# Patient Record
Sex: FEMALE | Race: BLACK OR AFRICAN AMERICAN | Employment: FULL TIME | URBAN - METROPOLITAN AREA
[De-identification: names, ages, dates, MRNs, and addresses within clinical notes are randomized per-mention and may not be internally consistent; named-entity substitution may affect disease eponyms.]

---

## 2017-07-19 ENCOUNTER — OFFICE VISIT (OUTPATIENT)
Dept: INTERNAL MEDICINE CLINIC | Age: 37
End: 2017-07-19

## 2017-07-19 VITALS
SYSTOLIC BLOOD PRESSURE: 115 MMHG | RESPIRATION RATE: 17 BRPM | DIASTOLIC BLOOD PRESSURE: 77 MMHG | TEMPERATURE: 98.1 F | WEIGHT: 146.2 LBS | HEIGHT: 63 IN | HEART RATE: 83 BPM | BODY MASS INDEX: 25.91 KG/M2 | OXYGEN SATURATION: 97 %

## 2017-07-19 DIAGNOSIS — Z00.00 ENCOUNTER FOR ANNUAL PHYSICAL EXAM: Primary | ICD-10-CM

## 2017-07-19 RX ORDER — CYCLOBENZAPRINE HCL 5 MG
5 TABLET ORAL
Qty: 20 TAB | Refills: 1 | Status: SHIPPED | OUTPATIENT
Start: 2017-07-19 | End: 2019-09-04

## 2017-07-19 NOTE — PROGRESS NOTES
Chief Complaint   Patient presents with    Complete Physical     1. Have you been to the ER, urgent care clinic since your last visit? Hospitalized since your last visit? No    2. Have you seen or consulted any other health care providers outside of the [address] [place] [address] since your last visit? Include any pap smears or colon screening.  No

## 2017-07-19 NOTE — MR AVS SNAPSHOT
Visit Information Date & Time Provider Department Dept. Phone Encounter #  
 7/19/2017  3:00 PM Hiwot Aguirre, [address] [facility] [phone] [id number] Follow-up Instructions Return in about 1 year (around 7/19/2018) for CPE/ fasting labs. Upcoming Health Maintenance Date Due DTaP/Tdap/Td series (1 - Tdap) 9/7/2002 PAP AKA CERVICAL CYTOLOGY 9/7/2002 INFLUENZA AGE 9 TO ADULT 8/1/2017 Allergies as of 7/19/2017  Review Complete On: 7/19/2017 By: Hiwot Aguirre MD  
  
 Severity Noted Reaction Type Reactions Amoxicillin  05/23/2011    Hives Current Immunizations  Reviewed on 2/24/2012 No immunizations on file. Not reviewed this visit You Were Diagnosed With   
  
 Codes Comments Encounter for annual physical exam    -  Primary ICD-10-CM: Z00.00 ICD-9-CM: V70.0 Vitals BP Pulse Temp Resp Height(growth percentile) Weight(growth percentile) 115/77 (BP 1 Location: Left arm, BP Patient Position: Sitting) 83 98.1 °F (36.7 °C) (Oral) 17 5' 2.5\" (1.588 m) 146 lb 3.2 oz (66.3 kg) LMP SpO2 BMI OB Status Smoking Status 02/01/2017 (Approximate) 97% 26.31 kg/m2 Implant Never Smoker BMI and BSA Data Body Mass Index Body Surface Area  
 26.31 kg/m 2 1.71 m 2 Preferred Pharmacy Pharmacy Name Phone [id number] [address] [phone] Your Updated Medication List  
  
   
This list is accurate as of: 7/19/17  4:27 PM.  Always use your most recent med list.  
  
  
  
  
 cyclobenzaprine 5 mg tablet Commonly known as:  FLEXERIL Take 1 Tab by mouth nightly. NEXPLANON 68 mg Impl Generic drug:  etonogestrel  
by SubDERmal route. Prescriptions Sent to Pharmacy Refills  
 cyclobenzaprine (FLEXERIL) 5 mg tablet 1 Sig: Take 1 Tab by mouth nightly.   
 Class: Normal  
 Pharmacy: [address] [place] [address]  #: [phone] Route: Oral  
  
We Performed the Following AMB POC EKG ROUTINE W/ 12 LEADS, INTER & REP [43518 CPT(R)] AMB POC URINALYSIS DIP STICK AUTO W/ MICRO  [12678 CPT(R)] CBC WITH AUTOMATED DIFF [79877 CPT(R)] LIPID PANEL [85886 CPT(R)] METABOLIC PANEL, COMPREHENSIVE [53120 CPT(R)] REFERRAL TO CARDIOLOGY [GJP86 Custom] Comments:  
 Please evaluate patient for palpitations and chest discomfort T4, FREE [id number] CPT(R)] TSH 3RD GENERATION [11175 CPT(R)] Follow-up Instructions Return in about 1 year (around 7/19/2018) for CPE/ fasting labs. Referral Information Referral ID Referred By Referred To  
  
 [id number] [place], [address], [place]   
   [address], [facility] Phone: [phone] Fax: [phone] Visits Status Start Date End Date 1 New Request 7/19/17 7/19/18 If your referral has a status of pending review or denied, additional information will be sent to support the outcome of this decision. Introducing [facility] HE[facility] SERVICES! [address] introduces [employer] patient portal. Now you can access parts of your medical record, email your doctor's office, and request medication refills online. 1. In your internet browser, go to [email or web address] 2. Click on the First Time User? Click Here link in the Sign In box. You will see the New Member Sign Up page. 3. Enter your [employer] Access Code exactly as it appears below. You will not need to use this code after youve completed the sign-up process. If you do not sign up before the expiration date, you must request a new code. · [employer] Access Code: [email or web address] Expires: 10/17/2017  4:24 PM 
 
4. Enter the last four digits of your Social Security Number (xxxx) and Date of Birth (mm/dd/yyyy) as indicated and click Submit. You will be taken to the next sign-up page. 5. Create a [employer] ID. This will be your [employer] login ID and cannot be changed, so think of one that is secure and easy to remember. 6. Create a [employer] password. You can change your password at any time. 7. Enter your Password Reset Question and Answer. This can be used at a later time if you forget your password. 8. Enter your e-mail address. You will receive e-mail notification when new information is available in [address] 9. Click Sign Up. You can now view and download portions of your medical record. 10. Click the Download Summary menu link to download a portable copy of your medical information. If you have questions, please visit the Frequently Asked Questions section of the [employer] website. Remember, [employer] is NOT to be used for urgent needs. For medical emergencies, dial 911. Now available from your iPhone and Android! Please provide this summary of care documentation to your next provider. Your primary care clinician is listed as Eleuterio Pisano. If you have any questions after today's visit, please call [phone].

## 2017-07-19 NOTE — PROGRESS NOTES
SUBJECTIVE:   Aurelia Wynne is a 29 y.o. female who is here for complete physical exam.    Pt denies regular exercise. Pt reports occasionally feeling a \"gush\" or \"big movement\" through her heart. Pt states this feeling only occurs occasionally but is significant enough that she notices when it happens. Pt reports loose stool with eating certain foods. Pt reports MRI last year for hip pain. Pt states she went to PT with relief of sciatica. Pt specifically denies changes in vision or hearing, trouble with swallowing or taste, CP, SOB, heartburn or upset stomach, change in bowel habits, problems urinating, unusual joint or muscle pains, numbness or tingling in extremities, or skin lesions of concern. Routine Health Maintenance:   Tdap: due, ordered today    Gyn: utd 7/19/2017    At this time, she is otherwise doing well and has brought no other complaints to my attention today. For a list of the medical issues addressed today, see the assessment and plan below. PMH:   Past Medical History:   Diagnosis Date    Acid reflux     IBS (irritable bowel syndrome)        PSH:  has no past surgical history on file. Allergies: is allergic to amoxicillin. Meds:   Current Outpatient Prescriptions   Medication Sig    cyclobenzaprine (FLEXERIL) 5 mg tablet Take 1 Tab by mouth nightly.  etonogestrel (NEXPLANON) 68 mg impl by SubDERmal route. No current facility-administered medications for this visit. Fam hx: family history includes High Cholesterol in her father and mother; Hypertension in her father and mother. Soc hx:  reports that she has never smoked. She has never used smokeless tobacco. She reports that she does not drink alcohol or use illicit drugs.       Review of Systems - History obtained from the patient  General ROS: negative  Psychological ROS: negative  Ophthalmic ROS: negative  ENT ROS: negative  Respiratory ROS: no cough, shortness of breath, or wheezing  Cardiovascular ROS: +\"gushing\" sensation in chest, otherwise no chest pain or dyspnea on exertion  Gastrointestinal ROS: +loose stool related to diet, no abdominal pain, change in bowel habits, or black or bloody stools  Genito-Urinary ROS: negative  Musculoskeletal ROS: negative  Neurological ROS: negative  Dermatological ROS: negative    OBJECTIVE:   Vitals:   Visit Vitals    /77 (BP 1 Location: Left arm, BP Patient Position: Sitting)    Pulse 83    Temp 98.1 °F (36.7 °C) (Oral)    Resp 17    Ht 5' 2.5\" (1.588 m)    Wt 146 lb 3.2 oz (66.3 kg)    LMP 02/01/2017 (Approximate)    SpO2 97%    BMI 26.31 kg/m2     Gen: Pleasant 29 y.o. female in NAD. HEENT: PERRLA. EOMI. OP moist and pink. EARS: TMs normal and canals equal bilaterally. NECK: Supple. No LAD. No thyromegaly. HEART: RRR, No M/G/R.   LUNGS: CTAB No W/R. ABDOMEN: S, NT, ND, BS+. EXTREMITIES: Warm. No C/C/E.  MUSCULOSKELETAL: Normal ROM, muscle strength 5/5 all groups. Muscle spasm in right shoulder and right upper back. NEURO: Alert and oriented x 3. Cranial nerves grossly intact. No focal sensory or motor deficits noted. SKIN: Warm. Dry. No rashes or other lesions noted. ASSESSMENT/ PLAN:     Emmett Hernandes was seen today for complete physical.    Diagnoses and all orders for this visit:    Encounter for annual physical exam  -     LIPID PANEL  -     METABOLIC PANEL, COMPREHENSIVE  -     T4, FREE  -     TSH 3RD GENERATION  -     CBC WITH AUTOMATED DIFF  -     AMB POC URINALYSIS DIP STICK AUTO W/ MICRO   -     AMB POC EKG ROUTINE W/ 12 LEADS, INTER & REP  -     cyclobenzaprine (FLEXERIL) 5 mg tablet; Take 1 Tab by mouth nightly.  -     REFERRAL TO CARDIOLOGY      I prescribed Flexeril 5mg nightly for management of spasm in upper right back and right shoulder.     Pt was referred to Dr. Lawanda Zhang (cardiology) and I ordered an EKG, which returned with no significant findings, for further investigation of \"gushing\" sensation in chest.     Aurelia Caceres's physical exam was normal and urinalysis was clear. Pt was given lab orders for a CBC, CMP, lipid panel, T4, and TSH to have done when she is fasting. Pt will f/u in one year for CPE/fasting labs. Follow-up Disposition:  Return in about 1 year (around 7/19/2018) for CPE/ fasting labs. I have reviewed the patient's medications and risks/side effects/benefits were discussed. Diagnosis(-es) explained to patient and questions answered. Literature provided where appropriate.       Written by Milly Mosqueda, as dictated by Becka Johnson MD.

## 2017-07-21 ENCOUNTER — APPOINTMENT (OUTPATIENT)
Dept: INTERNAL MEDICINE CLINIC | Age: 37
End: 2017-07-21

## 2017-07-22 LAB
ALBUMIN SERPL-MCNC: 4.2 G/DL (ref 3.5–5.5)
ALBUMIN/GLOB SERPL: 1.6 {RATIO} (ref 1.2–2.2)
ALP SERPL-CCNC: 46 IU/L (ref 39–117)
ALT SERPL-CCNC: 10 IU/L (ref 0–32)
AST SERPL-CCNC: 15 IU/L (ref 0–40)
BASOPHILS # BLD AUTO: 0 X10E3/UL (ref 0–0.2)
BASOPHILS NFR BLD AUTO: 0 %
BILIRUB SERPL-MCNC: 0.5 MG/DL (ref 0–1.2)
BUN SERPL-MCNC: 15 MG/DL (ref 6–20)
BUN/CREAT SERPL: 18 (ref 9–23)
CALCIUM SERPL-MCNC: 9 MG/DL (ref 8.7–10.2)
CHLORIDE SERPL-SCNC: 102 MMOL/L (ref 96–106)
CHOLEST SERPL-MCNC: 166 MG/DL (ref 100–199)
CO2 SERPL-SCNC: 21 MMOL/L (ref 18–29)
CREAT SERPL-MCNC: 0.83 MG/DL (ref 0.57–1)
EOSINOPHIL # BLD AUTO: 0.1 X10E3/UL (ref 0–0.4)
EOSINOPHIL NFR BLD AUTO: 1 %
ERYTHROCYTE [DISTWIDTH] IN BLOOD BY AUTOMATED COUNT: 13.7 % (ref 12.3–15.4)
GLOBULIN SER CALC-MCNC: 2.6 G/DL (ref 1.5–4.5)
GLUCOSE SERPL-MCNC: 85 MG/DL (ref 65–99)
HCT VFR BLD AUTO: 39.4 % (ref 34–46.6)
HDLC SERPL-MCNC: 55 MG/DL
HGB BLD-MCNC: 13.7 G/DL (ref 11.1–15.9)
IMM GRANULOCYTES # BLD: 0 X10E3/UL (ref 0–0.1)
IMM GRANULOCYTES NFR BLD: 0 %
LDLC SERPL CALC-MCNC: 102 MG/DL (ref 0–99)
LYMPHOCYTES # BLD AUTO: 1.3 X10E3/UL (ref 0.7–3.1)
LYMPHOCYTES NFR BLD AUTO: 25 %
MCH RBC QN AUTO: 30.4 PG (ref 26.6–33)
MCHC RBC AUTO-ENTMCNC: 34.8 G/DL (ref 31.5–35.7)
MCV RBC AUTO: 88 FL (ref 79–97)
MONOCYTES # BLD AUTO: 0.4 X10E3/UL (ref 0.1–0.9)
MONOCYTES NFR BLD AUTO: 7 %
NEUTROPHILS # BLD AUTO: 3.5 X10E3/UL (ref 1.4–7)
NEUTROPHILS NFR BLD AUTO: 67 %
PLATELET # BLD AUTO: 252 X10E3/UL (ref 150–379)
POTASSIUM SERPL-SCNC: 4.3 MMOL/L (ref 3.5–5.2)
PROT SERPL-MCNC: 6.8 G/DL (ref 6–8.5)
RBC # BLD AUTO: 4.5 X10E6/UL (ref 3.77–5.28)
SODIUM SERPL-SCNC: 141 MMOL/L (ref 134–144)
T4 FREE SERPL-MCNC: 1.06 NG/DL (ref 0.82–1.77)
TRIGL SERPL-MCNC: 45 MG/DL (ref 0–149)
TSH SERPL DL<=0.005 MIU/L-ACNC: 2.02 UIU/ML (ref 0.45–4.5)
VLDLC SERPL CALC-MCNC: 9 MG/DL (ref 5–40)
WBC # BLD AUTO: 5.2 X10E3/UL (ref 3.4–10.8)

## 2018-08-29 ENCOUNTER — OFFICE VISIT (OUTPATIENT)
Dept: INTERNAL MEDICINE CLINIC | Age: 38
End: 2018-08-29

## 2018-08-29 VITALS
TEMPERATURE: 98.3 F | BODY MASS INDEX: 24.59 KG/M2 | SYSTOLIC BLOOD PRESSURE: 106 MMHG | HEIGHT: 63 IN | OXYGEN SATURATION: 97 % | RESPIRATION RATE: 17 BRPM | HEART RATE: 76 BPM | WEIGHT: 138.8 LBS | DIASTOLIC BLOOD PRESSURE: 72 MMHG

## 2018-08-29 DIAGNOSIS — Z00.00 ANNUAL PHYSICAL EXAM: Primary | ICD-10-CM

## 2018-08-29 NOTE — PROGRESS NOTES
SUBJECTIVE:  
Aurelia Elise is a 40 y.o. female who is here for complete physical exam. 
 
Pt notes fatigue. Pt endorses regular exercise. Pt denies use of vitamins. Pt reports after her last yearly physical she went to [facility] for cervical radiculopathy, but she has not had issues since. PREVENTIVE: 
Pap: WWE scheduled for next week Tdap: pt states current Flu: declined Pt specifically denies changes in vision or hearing, trouble with swallowing or taste, CP, SOB, heartburn or upset stomach, change in bowel habits, problems urinating, unusual joint or muscle pains, numbness or tingling in extremities, or skin lesions of concern. At this time, she is otherwise doing well and has brought no other complaints to my attention today. For a list of the medical issues addressed today, see the assessment and plan below. PMH:  
Past Medical History:  
Diagnosis Date  Acid reflux  IBS (irritable bowel syndrome) PSH:  has no past surgical history on file. Allergies: is allergic to amoxicillin. Meds:  
Current Outpatient Prescriptions Medication Sig  etonogestrel (NEXPLANON) 68 mg impl by SubDERmal route.  cyclobenzaprine (FLEXERIL) 5 mg tablet Take 1 Tab by mouth nightly. No current facility-administered medications for this visit. Fam hx: family history includes High Cholesterol in her father and mother; Hypertension in her father and mother. Soc hx:  reports that she has never smoked. She has never used smokeless tobacco. She reports that she does not drink alcohol or use illicit drugs. Review of Systems - History obtained from the patient General ROS: fatigue Psychological ROS: negative Ophthalmic ROS: negative ENT ROS: negative Respiratory ROS: no cough, shortness of breath, or wheezing Cardiovascular ROS: no chest pain or dyspnea on exertion Gastrointestinal ROS: no abdominal pain, change in bowel habits, or black or bloody stools Genito-Urinary ROS: negative Musculoskeletal ROS: negative Neurological ROS: negative Dermatological ROS: negative OBJECTIVE:  
Vitals:  
Visit Vitals  /72 (BP 1 Location: Right arm, BP Patient Position: Sitting)  Pulse 76  Temp 98.3 °F (36.8 °C) (Oral)  Resp 17  Ht 5' 2.5\" (1.588 m)  Wt 138 lb 12.8 oz (63 kg)  SpO2 97%  BMI 24.98 kg/m2 Gen: Pleasant 40 y.o. female in NAD. HEENT: PERRLA. EOMI. OP moist and pink. EARS: TMs normal and canals equal bilaterally. NECK: + thyromegaly Supple. No LAD. HEART: RRR, No M/G/R.    
LUNGS: CTAB No W/R. ABDOMEN: S, NT, ND, BS+. EXTREMITIES: Warm. No C/C/E.   
MUSCULOSKELETAL: Normal ROM, muscle strength 5/5 all groups. NEURO: Alert and oriented x 3. Cranial nerves grossly intact. No focal sensory or motor deficits noted. SKIN: Warm. Dry. No rashes or other lesions noted. ASSESSMENT/ PLAN:  
 
Diagnoses and all orders for this visit: 
 
1. Annual physical exam 
-     LIPID PANEL 
-     METABOLIC PANEL, COMPREHENSIVE 
-     CBC WITH AUTOMATED DIFF 
-     T4, FREE 
-     TSH 3RD GENERATION 
-     US THYROID/PARATHYROID/SOFT TISS; Future 1. Annual physical exam 
Aurelia Caceres's physical exam was normal. Pt was given lab orders for a CBC, CMP, lipid panel, T4, and TSH to have done when she is fasting. I ordered a thyroid/parathyroid US for further assessment of noted thyromegaly. Follow-up Disposition: 
Return in about 1 year (around 8/29/2019) for CPE/fasting labs. I have reviewed the patient's medications and risks/side effects/benefits were discussed. Diagnosis(-es) explained to patient and questions answered. Literature provided where appropriate.   
 
Written by Vj Pelayo, as dictated by Des Cardozo MD.

## 2018-08-29 NOTE — MR AVS SNAPSHOT
[address] [facility] [address] [facility] 83. 
[phone] Patient: Rolo Martins MRN: [id number] WJM:[birth year] Visit Information Date & Time Provider Department Dept. Phone Encounter #  
 8/29/2018  3:00 PM Liliana Kothari, [address] [place] [address],[address] [phone] [id number] Follow-up Instructions Return in about 1 year (around 8/29/2019) for CPE/fasting labs. Upcoming Health Maintenance Date Due DTaP/Tdap/Td series (1 - Tdap) 9/7/2002 PAP AKA CERVICAL CYTOLOGY 9/7/2002 Influenza Age 5 to Adult 8/1/2018 Allergies as of 8/29/2018  Review Complete On: 8/29/2018 By: Tesha Lei LPN Severity Noted Reaction Type Reactions Amoxicillin  05/23/2011    Hives Current Immunizations  Reviewed on 2/24/2012 No immunizations on file. Not reviewed this visit You Were Diagnosed With   
  
 Codes Comments Annual physical exam    -  Primary ICD-10-CM: Z00.00 ICD-9-CM: V70.0 Vitals BP Pulse Temp Resp Height(growth percentile) Weight(growth percentile) 106/72 (BP 1 Location: Right arm, BP Patient Position: Sitting) 76 98.3 °F (36.8 °C) (Oral) 17 5' 2.5\" (1.588 m) 138 lb 12.8 oz (63 kg) SpO2 BMI OB Status Smoking Status 97% 24.98 kg/m2 Implant Never Smoker Vitals History BMI and BSA Data Body Mass Index Body Surface Area 24.98 kg/m 2 1.67 m 2 Preferred Pharmacy Pharmacy Name Phone [id number] Yaelon Gowers, [address] [place] [phone] Your Updated Medication List  
  
   
This list is accurate as of 8/29/18  3:56 PM.  Always use your most recent med list.  
  
  
  
  
 cyclobenzaprine 5 mg tablet Commonly known as:  FLEXERIL Take 1 Tab by mouth nightly. NEXPLANON 68 mg Impl Generic drug:  etonogestrel  
by SubDERmal route. We Performed the Following CBC WITH AUTOMATED DIFF [12821 CPT(R)] LIPID PANEL [74283 CPT(R)] METABOLIC PANEL, COMPREHENSIVE [05545 CPT(R)] T4, FREE [id number] CPT(R)] TSH 3RD GENERATION [99694 CPT(R)] Follow-up Instructions Return in about 1 year (around 8/29/2019) for CPE/fasting labs. To-Do List   
 08/29/2018 Imaging:  US THYROID/PARATHYROID/SOFT TISS Introducing \[facility]\"" & HEALTH SERVICES! [facility] introduces [employer] patient portal. Now you can access parts of your medical record, email your doctor's office, and request medication refills online. 1. In your internet browser, go to [email or web address] 2. Click on the First Time User? Click Here link in the Sign In box. You will see the New Member Sign Up page. 3. Enter your [employer] Access Code exactly as it appears below. You will not need to use this code after youve completed the sign-up process. If you do not sign up before the expiration date, you must request a new code. · [employer] Access Code: [email or web address] Expires: 11/27/2018  3:56 PM 
 
4. Enter the last four digits of your Social Security Number (xxxx) and Date of Birth (mm/dd/yyyy) as indicated and click Submit. You will be taken to the next sign-up page. 5. Create a [employer] ID. This will be your [employer] login ID and cannot be changed, so think of one that is secure and easy to remember. 6. Create a [employer] password. You can change your password at any time. 7. Enter your Password Reset Question and Answer. This can be used at a later time if you forget your password. 8. Enter your e-mail address. You will receive e-mail notification when new information is available in [address] 9. Click Sign Up. You can now view and download portions of your medical record. 10. Click the Download Summary menu link to download a portable copy of your medical information.  
 
If you have questions, please visit the Frequently Asked Questions section of the [employer]. Remember, [employer] is NOT to be used for urgent needs. For medical emergencies, dial 911. Now available from your iPhone and Android! Please provide this summary of care documentation to your next provider. Your primary care clinician is listed as Taylor Nielson. If you have any questions after today's visit, please call [phone].

## 2018-08-29 NOTE — PROGRESS NOTES
Chief Complaint Patient presents with  Physical  
  yearly physical  
 
1. Have you been to the ER, urgent care clinic since your last visit? [facility] august of last year Hospitalized since your last visit? No   
 
2. Have you seen or consulted any other health care providers outside of the [facility] since your last visit?  No

## 2018-09-07 ENCOUNTER — HOSPITAL ENCOUNTER (OUTPATIENT)
Dept: ULTRASOUND IMAGING | Age: 38
Discharge: HOME | End: 2018-09-07
Attending: FAMILY MEDICINE
Payer: COMMERCIAL

## 2018-09-07 DIAGNOSIS — Z00.00 ANNUAL PHYSICAL EXAM: ICD-10-CM

## 2018-09-07 PROCEDURE — 76536 US EXAM OF HEAD AND NECK: CPT

## 2018-09-08 LAB
ALBUMIN SERPL-MCNC: 4.3 G/DL (ref 3.5–5.5)
ALBUMIN/GLOB SERPL: 1.7 {RATIO} (ref 1.2–2.2)
ALP SERPL-CCNC: 50 IU/L (ref 39–117)
ALT SERPL-CCNC: 13 IU/L (ref 0–32)
AST SERPL-CCNC: 17 IU/L (ref 0–40)
BASOPHILS # BLD AUTO: 0 X10E3/UL (ref 0–0.2)
BASOPHILS NFR BLD AUTO: 0 %
BILIRUB SERPL-MCNC: 0.7 MG/DL (ref 0–1.2)
BUN SERPL-MCNC: 14 MG/DL (ref 6–20)
BUN/CREAT SERPL: 17 (ref 9–23)
CALCIUM SERPL-MCNC: 8.7 MG/DL (ref 8.7–10.2)
CHLORIDE SERPL-SCNC: 106 MMOL/L (ref 96–106)
CHOLEST SERPL-MCNC: 164 MG/DL (ref 100–199)
CO2 SERPL-SCNC: 19 MMOL/L (ref 20–29)
CREAT SERPL-MCNC: 0.82 MG/DL (ref 0.57–1)
EOSINOPHIL # BLD AUTO: 0 X10E3/UL (ref 0–0.4)
EOSINOPHIL NFR BLD AUTO: 1 %
ERYTHROCYTE [DISTWIDTH] IN BLOOD BY AUTOMATED COUNT: 13.8 % (ref 12.3–15.4)
GLOBULIN SER CALC-MCNC: 2.6 G/DL (ref 1.5–4.5)
GLUCOSE SERPL-MCNC: 81 MG/DL (ref 65–99)
HCT VFR BLD AUTO: 39.5 % (ref 34–46.6)
HDLC SERPL-MCNC: 64 MG/DL
HGB BLD-MCNC: 13.5 G/DL (ref 11.1–15.9)
IMM GRANULOCYTES # BLD: 0 X10E3/UL (ref 0–0.1)
IMM GRANULOCYTES NFR BLD: 0 %
LDLC SERPL CALC-MCNC: 91 MG/DL (ref 0–99)
LYMPHOCYTES # BLD AUTO: 0.9 X10E3/UL (ref 0.7–3.1)
LYMPHOCYTES NFR BLD AUTO: 22 %
MCH RBC QN AUTO: 30.8 PG (ref 26.6–33)
MCHC RBC AUTO-ENTMCNC: 34.2 G/DL (ref 31.5–35.7)
MCV RBC AUTO: 90 FL (ref 79–97)
MONOCYTES # BLD AUTO: 0.5 X10E3/UL (ref 0.1–0.9)
MONOCYTES NFR BLD AUTO: 11 %
NEUTROPHILS # BLD AUTO: 2.8 X10E3/UL (ref 1.4–7)
NEUTROPHILS NFR BLD AUTO: 66 %
PLATELET # BLD AUTO: 246 X10E3/UL (ref 150–379)
POTASSIUM SERPL-SCNC: 4.4 MMOL/L (ref 3.5–5.2)
PROT SERPL-MCNC: 6.9 G/DL (ref 6–8.5)
RBC # BLD AUTO: 4.39 X10E6/UL (ref 3.77–5.28)
SODIUM SERPL-SCNC: 141 MMOL/L (ref 134–144)
T4 FREE SERPL-MCNC: 1.07 NG/DL (ref 0.82–1.77)
TRIGL SERPL-MCNC: 46 MG/DL (ref 0–149)
TSH SERPL DL<=0.005 MIU/L-ACNC: 1.24 UIU/ML (ref 0.45–4.5)
VLDLC SERPL CALC-MCNC: 9 MG/DL (ref 5–40)
WBC # BLD AUTO: 4.2 X10E3/UL (ref 3.4–10.8)

## 2018-09-10 ENCOUNTER — TELEPHONE (OUTPATIENT)
Dept: INTERNAL MEDICINE CLINIC | Age: 38
End: 2018-09-10

## 2018-09-10 NOTE — PROGRESS NOTES
Message was left for patient that results were  Sent to her via [employer] and to contact office if she wants further evaluation.

## 2018-09-10 NOTE — PROGRESS NOTES
Please inform the patient her thyroid gland has cysts , but no nodules. Cysts are usually benign and her thyroid function is normal. If she wants further evaluation please give her the contact number for endocrinology.

## 2018-09-10 NOTE — TELEPHONE ENCOUNTER
----- Message from Pedro Catalan MD sent at 9/9/2018 10:51 PM EDT -----  Please inform the patient her thyroid gland has cysts , but no nodules. Cysts are usually benign and her thyroid function is normal. If she wants further evaluation please give her the contact number for endocrinology.

## 2019-09-04 ENCOUNTER — OFFICE VISIT (OUTPATIENT)
Dept: INTERNAL MEDICINE CLINIC | Age: 39
End: 2019-09-04

## 2019-09-04 VITALS
HEART RATE: 71 BPM | BODY MASS INDEX: 24.45 KG/M2 | WEIGHT: 138 LBS | RESPIRATION RATE: 16 BRPM | HEIGHT: 63 IN | DIASTOLIC BLOOD PRESSURE: 84 MMHG | SYSTOLIC BLOOD PRESSURE: 122 MMHG | TEMPERATURE: 98 F | OXYGEN SATURATION: 97 %

## 2019-09-04 DIAGNOSIS — Z00.00 ROUTINE GENERAL MEDICAL EXAMINATION AT A HEALTH CARE FACILITY: Primary | ICD-10-CM

## 2019-09-04 DIAGNOSIS — Z00.00 ANNUAL PHYSICAL EXAM: Primary | ICD-10-CM

## 2019-09-04 LAB
BILIRUB UR QL STRIP: NEGATIVE
GLUCOSE UR-MCNC: NEGATIVE MG/DL
KETONES P FAST UR STRIP-MCNC: NEGATIVE MG/DL
PH UR STRIP: 6 [PH] (ref 4.6–8)
PROT UR QL STRIP: NEGATIVE
SP GR UR STRIP: 1.01 (ref 1–1.03)
UA UROBILINOGEN AMB POC: NORMAL (ref 0.2–1)
URINALYSIS CLARITY POC: CLEAR
URINALYSIS COLOR POC: YELLOW
URINE BLOOD POC: NEGATIVE
URINE LEUKOCYTES POC: NEGATIVE
URINE NITRITES POC: NEGATIVE

## 2019-09-04 RX ORDER — BUSPIRONE HYDROCHLORIDE 7.5 MG/1
7.5 TABLET ORAL 3 TIMES DAILY
Qty: 90 TAB | Refills: 3 | Status: SHIPPED | OUTPATIENT
Start: 2019-09-04

## 2019-09-04 NOTE — PROGRESS NOTES
Identified pt with two pt identifiers(name and ). Reviewed record in preparation for visit and have obtained necessary documentation. Chief Complaint   Patient presents with    Complete Physical        Visit Vitals  /84 (BP 1 Location: Right arm, BP Patient Position: Sitting)   Pulse 71   Temp 98 °F (36.7 °C) (Oral)   Resp 16   Ht 5' 2.5\" (1.588 m)   Wt 138 lb (62.6 kg)   SpO2 97%   BMI 24.84 kg/m²       Health Maintenance Due   Topic    DTaP/Tdap/Td series (1 - Tdap)    PAP AKA CERVICAL CYTOLOGY     Influenza Age 5 to Adult        Med Reconciliation: Completed    Coordination of Care Questionnaire:  :   1) Have you been to an emergency room, urgent care, or hospitalized since your last visit? If yes, where when, and reason for visit? Yes, [facility] in 2019 for hives. 2. Have seen or consulted any other health care provider since your last visit? If yes, where when, and reason for visit? Yes, Dr. Leatha Hayes (Allergist)       3) Do you have an Advanced Directive/ Living Will in place? No  If yes, do we have a copy on file   If no, would you like information     Patient is accompanied by self I have received verbal consent from [address] [place] [address] to discuss any/all medical information while they are present in the room.

## 2019-09-04 NOTE — PROGRESS NOTES
SUBJECTIVE:   Aurelia Garcia is a 41 y.o. female who is here for complete physical exam.    Pt specifically denies changes in vision or hearing, trouble with swallowing or taste, CP, SOB, upset stomach, change in bowel habits, problems urinating, unusual muscle pains, numbness or tingling in extremities, or skin lesions of concern. Pt notes that she has been experiencing arthralgia in her R hand (dominant) throughout her knuckles from holding instruments. Pt endorses nausea, heartburn, and headaches for the past three days that she attributes to stress. She notes that the headache will wake her up in the night. She has been taking an antihistamine to minimize the headache. Pt reports that she recently experienced an allergic reaction on her torso, legs, arms, and the tops of her feet that presented with erythema and blotches before she went on a cruise in [place]. The rash persisted for multiple days after presentation. She saw an allergist who was unable to determine the cause of the reaction. Pt suspects seafood, but has since tried seafood with no recurrent reaction. Pt denies sore throat, fever, chills, or new lotions/soaps/detergents. At this time, she is otherwise doing well and has brought no other complaints to my attention today. For a list of the medical issues addressed today, see the assessment and plan below. PMH:   Past Medical History:   Diagnosis Date    Acid reflux     IBS (irritable bowel syndrome)        PSH:  has no past surgical history on file. Allergies: is allergic to amoxicillin. Meds:   Current Outpatient Medications   Medication Sig    etonogestrel (NEXPLANON) 68 mg impl by SubDERmal route. No current facility-administered medications for this visit. Fam hx: family history includes High Cholesterol in her father and mother; Hypertension in her father and mother. Soc hx:  reports that she has never smoked.  She has never used smokeless tobacco. She reports that she does not drink alcohol or use drugs. Review of Systems - History obtained from the patient  General ROS: +nausea. Psychological ROS: negative  Ophthalmic ROS: negative  ENT ROS: negative  Respiratory ROS: no cough, shortness of breath, or wheezing  Cardiovascular ROS: no chest pain or dyspnea on exertion  Gastrointestinal ROS: +heartburn. no abdominal pain, change in bowel habits, or black or bloody stools  Genito-Urinary ROS: negative  Musculoskeletal ROS: negative  Neurological ROS: +headaches. Dermatological ROS: negative    OBJECTIVE:   Vitals:   Visit Vitals  /84 (BP 1 Location: Right arm, BP Patient Position: Sitting)   Pulse 71   Temp 98 °F (36.7 °C) (Oral)   Resp 16   Ht 5' 2.5\" (1.588 m)   Wt 138 lb (62.6 kg)   SpO2 97%   BMI 24.84 kg/m²     Gen: Pleasant 41 y.o. female in NAD. HEENT:  +swollen nares and turbinates bilaterally, erythema. PERRLA. EOMI. OP moist and pink. EARS: TMs normal and canals equal bilaterally. No significant fluid behind TM. NECK: Supple. No LAD. No thyromegaly. HEART: RRR, No M/G/R.     LUNGS: +RL Lung decreased breath sounds. CTAB No W/R. ABDOMEN: S, NT, ND, BS+. EXTREMITIES: Warm. No C/C/E.    MUSCULOSKELETAL: Normal ROM, muscle strength 5/5 all groups. NEURO: Alert and oriented x 3. Cranial nerves grossly intact. No focal sensory or motor deficits noted. SKIN: Warm. Dry. No rashes or other lesions noted. Female : normal external genitalia, no discharge, no lesions, no masses    ASSESSMENT/ PLAN:     Diagnoses and all orders for this visit:    1. Annual physical exam  -     LIPID PANEL  -     TSH 3RD GENERATION  -     T4, FREE  -     CBC WITH AUTOMATED DIFF  -     METABOLIC PANEL, COMPREHENSIVE  -     THOM MAMMO BI SCREENING INCL CAD; Future  -     XR CHEST PA LAT; Future      1. Annual Physical Exam  Aurelia Caceres's physical exam was normal and urinalysis was clear.  Pt was given lab orders for a CBC, CMP, lipid panel, T4, and TSH to have done when she is fasting. I ordered a mammogram for health maintenance. I advised pt to avoid food with high acidity and take Zantac every day for two weeks. I advised pt to try Flonase for congestion and sinus pain. She reports increased anxiety and was given a prescription for buspar. Follow-up and Dispositions    · Return in about 1 year (around 9/4/2020) for annual physical and 3 mo f/u. I have reviewed the patient's medications and risks/side effects/benefits were discussed. Diagnosis(-es) explained to patient and questions answered. Literature provided where appropriate.      Written by Patricia Melara, as dictated by Ashwini Haley MD.

## 2019-09-05 ENCOUNTER — TELEPHONE (OUTPATIENT)
Dept: INTERNAL MEDICINE CLINIC | Age: 39
End: 2019-09-05

## 2019-09-10 ENCOUNTER — TELEPHONE (OUTPATIENT)
Dept: INTERNAL MEDICINE CLINIC | Age: 39
End: 2019-09-10

## 2019-09-10 NOTE — TELEPHONE ENCOUNTER
Identified patient 2 identifiers verified. Patient want to know can she hold off on the Mammogram until she is 41. She had a lump in 2016 but the mammogram revealed cyst. Patient reports that she is not having any problems at this time.

## 2021-07-28 ENCOUNTER — TELEPHONE (OUTPATIENT)
Dept: INTERNAL MEDICINE CLINIC | Age: 41
End: 2021-07-28

## 2021-07-28 NOTE — TELEPHONE ENCOUNTER
Spoke w/ pt, scheduled for 9/22 w/ Dr. Crystal Brower for CPE and pt will call back if cancellation is needed

## 2021-07-28 NOTE — TELEPHONE ENCOUNTER
----- Message from [facility] sent at 7/28/2021 10:03 AM EDT -----  Regarding: FW: Dr. Mitchel Diamond: [phone]    ----- Message -----  From: Dominique Shaw  Sent: 7/28/2021  10:01 AM EDT  To: [facility]  Subject: Dr. Bo Knutson                             Appointment not available    Caller's first and last name and relationship to patient (if not the patient): N/A      Best contact number: [phone]      Preferred date and time: ASAP - after 2pm, if possible       Scheduled appointment date and time: 8/18/21 @ 1a0m      Reason for appointment: CPE      Details to clarify the request: Returning missed call to be rescheduled.       Message from [facility]

## 2021-07-28 NOTE — TELEPHONE ENCOUNTER
Called,  for pt to inform Dr. Yamila Joshi is only in office on Mondays and Wednesdays in the morning. Waiting for call back to r/s 8/18 appointment due to  being out of the office that week.

## 2021-07-28 NOTE — TELEPHONE ENCOUNTER
Pt states you are playing phone tag and to just give her a day/time on her vm and she will work it out.